# Patient Record
Sex: MALE | Race: BLACK OR AFRICAN AMERICAN | NOT HISPANIC OR LATINO | URBAN - METROPOLITAN AREA
[De-identification: names, ages, dates, MRNs, and addresses within clinical notes are randomized per-mention and may not be internally consistent; named-entity substitution may affect disease eponyms.]

---

## 2019-03-08 ENCOUNTER — OFFICE VISIT - HEALTHEAST (OUTPATIENT)
Dept: FAMILY MEDICINE | Facility: CLINIC | Age: 54
End: 2019-03-08

## 2019-03-08 DIAGNOSIS — Z76.89 ENCOUNTER TO ESTABLISH CARE: ICD-10-CM

## 2019-03-08 DIAGNOSIS — Z31.69 INFERTILITY COUNSELING: ICD-10-CM

## 2019-03-08 DIAGNOSIS — Z12.11 SCREEN FOR COLON CANCER: ICD-10-CM

## 2019-03-08 ASSESSMENT — MIFFLIN-ST. JEOR: SCORE: 1481.28

## 2019-04-02 ENCOUNTER — COMMUNICATION - HEALTHEAST (OUTPATIENT)
Dept: FAMILY MEDICINE | Facility: CLINIC | Age: 54
End: 2019-04-02

## 2019-04-25 ENCOUNTER — RECORDS - HEALTHEAST (OUTPATIENT)
Dept: ADMINISTRATIVE | Facility: OTHER | Age: 54
End: 2019-04-25

## 2021-05-25 ENCOUNTER — RECORDS - HEALTHEAST (OUTPATIENT)
Dept: ADMINISTRATIVE | Facility: CLINIC | Age: 56
End: 2021-05-25

## 2021-05-27 NOTE — TELEPHONE ENCOUNTER
Referral Request  Type of referral: ENDO AND INFERTILITY   Who s requesting: Patient  Why the request: INFERTILITY   Have you been seen for this request: Yes  Does patient have a preference on a group/provider? [facility]. HE [facility] IS SENDING NEW PATIENTS TO [place] FOR REFERRALS  Okay to leave a detailed message?  Yes

## 2021-06-02 VITALS — BODY MASS INDEX: 19.26 KG/M2 | HEIGHT: 71 IN | WEIGHT: 137.6 LBS

## 2021-06-24 NOTE — PROGRESS NOTES
"Assessment/Plan:        1. Encounter to establish care    2. Infertility counseling    3. Screen for colon cancer         Referral to infertility counseling and colonoscopy given respectively.    Patient wished to have no routine blood work done today.   Follow up as needed       Subjective:    Patient ID:   Karin Schwarz is a 53 y.o. male comes in to establish care and is accompanied by his wife.  He is stating that him and his wife are trying to conceive for the second child I mean I am able to do so and seeking infertility counseling.  He has no other concerns today      Review of Systems  Allergy: reviewed  General : negative  Ophthalmic : negative  ENT : negative  Respiratory : no cough, shortness of breath, or wheezing  Cardiovascular : no chest pain or dyspnea on exertion  Gastrointestinal : no abdominal pain, change in bowel habits, or black or bloody stools  Genito-Urinary :  no dysuria, trouble voiding, or hematuria  Dermatological : negative    Musculoskeletal : negative  Neurological : negative  Hematological and Lymphatic : negative  Endocrine : negative     The following patient's history were reviewed and updated as appropriate:   He  has no past medical history on file.  He  has a past surgical history that includes Nasal polyp surgery.  His family history includes No Medical Problems in his brother, father, mother, and sister.  He  reports that  has never smoked. he has never used smokeless tobacco. He reports that he does not drink alcohol or use drugs..      No outpatient encounter medications on file as of 3/8/2019.     No facility-administered encounter medications on file as of 3/8/2019.          Objective:   /60 (Patient Site: Right Arm, Patient Position: Sitting, Cuff Size: Adult Large)   Pulse (!) 58   Ht 5' 11\" (1.803 m)   Wt 137 lb 9.6 oz (62.4 kg)   SpO2 98%   BMI 19.19 kg/m        Physical Exam  General Appearance:    Alert, well hydrated, no distress,    Eyes:    PERRL, " conjunctiva/corneas clear,    Throat:   Lips, mucosa, and tongue normal; teeth and gums normal   Neck:   Supple, symmetrical, trachea midline, no adenopathy;        thyroid:  No enlargement/tenderness/nodules; no carotid    bruit or JVD   Lungs:     Clear to auscultation bilaterally, respirations unlabored   Heart:    Regular rate and rhythm, S1 and S2 normal, no murmur, rub   or gallop   Abdomen:     Soft, non-tender, normal bowel sounds, no rebound or guarding, no masses, no organomegaly   Extremities:   Extremities normal, atraumatic, no cyanosis or edema   Skin:   Skin color, texture, turgor normal, no rashes or lesions